# Patient Record
Sex: FEMALE | HISPANIC OR LATINO | URBAN - METROPOLITAN AREA
[De-identification: names, ages, dates, MRNs, and addresses within clinical notes are randomized per-mention and may not be internally consistent; named-entity substitution may affect disease eponyms.]

---

## 2020-10-30 ENCOUNTER — OFFICE VISIT (OUTPATIENT)
Dept: URBAN - METROPOLITAN AREA CLINIC 27 | Facility: CLINIC | Age: 40
End: 2020-10-30
Payer: COMMERCIAL

## 2020-10-30 DIAGNOSIS — E11.3513 TYPE 2 DIABETES MELLITUS WITH PROLIFERATIVE DIABETIC RETINOPATHY WITH MACULAR EDEMA, BILATERAL: Primary | ICD-10-CM

## 2020-10-30 DIAGNOSIS — H33.41 TRACTION DETACHMENT OF RETINA, RIGHT EYE: ICD-10-CM

## 2020-10-30 DIAGNOSIS — H43.813 VITREOUS DEGENERATION, BILATERAL: ICD-10-CM

## 2020-10-30 DIAGNOSIS — H04.123 DRY EYE SYNDROME OF BILATERAL LACRIMAL GLANDS: ICD-10-CM

## 2020-10-30 DIAGNOSIS — H33.051 TOTAL RETINAL DETACHMENT, RIGHT EYE: ICD-10-CM

## 2020-10-30 PROCEDURE — 92134 CPTRZ OPH DX IMG PST SGM RTA: CPT | Performed by: OPHTHALMOLOGY

## 2020-10-30 PROCEDURE — 67210 TREATMENT OF RETINAL LESION: CPT | Performed by: OPHTHALMOLOGY

## 2020-10-30 PROCEDURE — 67028 INJECTION EYE DRUG: CPT | Performed by: OPHTHALMOLOGY

## 2020-10-30 PROCEDURE — 92014 COMPRE OPH EXAM EST PT 1/>: CPT | Performed by: OPHTHALMOLOGY

## 2020-10-30 ASSESSMENT — INTRAOCULAR PRESSURE
OD: 18
OS: 22

## 2020-10-30 NOTE — IMPRESSION/PLAN
Impression: h/o Tractional retinal detachment OS. Status: Improved. s/p PPV/MP/PRP/C3F8 Gas 02/24/14 (DYK) Plan: Retina flat. RDW.

## 2020-10-30 NOTE — IMPRESSION/PLAN
Impression: DME OS. Status: Symptomatic
s/p Avastin x 2 last 08/28/14 Focal laser last 04/26/18 Plan: Exam and OCT reveal mild extrafoveal DME. Recommend focal laser. Will schedule.

## 2020-10-30 NOTE — IMPRESSION/PLAN
Impression: h/o TRD OD. Status: Improved. Resolved. s/p PPV / MP / PRP / possible gas 07/06/15 (DYK) Plan: Retina flat. RDW.

## 2020-10-30 NOTE — IMPRESSION/PLAN
Impression: PDR, OS. Status: Symptomatic.
s/p PRP 06/19/15 Plan: An IVFA from 10/22/15 demonstrated no NVE. Fundus Photos from 10/22/15 demonstrated no NVD. Observe.

## 2020-10-30 NOTE — IMPRESSION/PLAN
Impression: PDR, OD. Status: Controlled. s/p PRP 01/30/14 Plan: DM since 1995. Severe. Exam and OCT reveal no DME OD. An IVFA from 10/22/15 demonstrated no NVE. Fundus Photos from 10/22/15 demonstrated no NVD. Observe. BS control.

## 2020-10-30 NOTE — IMPRESSION/PLAN
Impression: DME, OD. Status: Symptomatic
s/p Focal laser last 05/16/19
s/p Avastin x 14 last 09/11/2019 Plan: The patient was lost to follow up. Exam and OCT reveal extrafoveal DME. Recommend Avastin series with Focal. 
R/B/A discussed with patient. The risks of Avastin were discussed, including off-label use and compounding pharmacy risks, and the patient elects to proceed with Avastin. After 2% Subconjunctival anesthesia & betadine prep, 1.25mg of Avastin was injected in the eye. Cold compress and Tylenol suggested for pain if needed. Thanks, Suresh Louise. 

Return in 1 week Focal OD, then focal OS, and in 1 month for re-eval DME, OCT OU, IVFA OD 1st

## 2020-11-05 ENCOUNTER — PROCEDURE (OUTPATIENT)
Dept: URBAN - METROPOLITAN AREA CLINIC 41 | Facility: CLINIC | Age: 40
End: 2020-11-05
Payer: COMMERCIAL

## 2020-11-05 PROCEDURE — 67210 TREATMENT OF RETINAL LESION: CPT | Performed by: OPHTHALMOLOGY

## 2020-11-05 ASSESSMENT — INTRAOCULAR PRESSURE
OD: 22
OS: 22

## 2021-02-11 ENCOUNTER — OFFICE VISIT (OUTPATIENT)
Dept: URBAN - METROPOLITAN AREA CLINIC 41 | Facility: CLINIC | Age: 41
End: 2021-02-11
Payer: COMMERCIAL

## 2021-02-11 PROCEDURE — 92014 COMPRE OPH EXAM EST PT 1/>: CPT | Performed by: OPHTHALMOLOGY

## 2021-02-11 PROCEDURE — 67028 INJECTION EYE DRUG: CPT | Performed by: OPHTHALMOLOGY

## 2021-02-11 PROCEDURE — 92134 CPTRZ OPH DX IMG PST SGM RTA: CPT | Performed by: OPHTHALMOLOGY

## 2021-02-11 ASSESSMENT — INTRAOCULAR PRESSURE
OD: 15
OS: 18

## 2021-02-11 NOTE — IMPRESSION/PLAN
Impression: DME, OD. Status: Symptomatic
s/p Focal laser last 11/5/2020
s/p Avastin x 15 last 10/30/2020 Plan: The patient was lost to follow up. Exam and OCT reveal extrafoveal DME. An IVFA from 02/11/2021 demonstrated no NVE. Fundus photos from 02/11/2021 demonstrated no NVD. Recommend Avastin series with focal.
R/B/A discussed with patient. The risks of Avastin were discussed, including off-label use and compounding pharmacy risks, and the patient elects to proceed with Avastin. After 2% Subconjunctival anesthesia & betadine prep, 1.25mg of Avastin was injected in the eye. Cold compress and Tylenol suggested for pain if needed. Thanks, Suresh Louise. Return in 1 week for focal OD, then focal OS, and 1 month for re-eval DME, OCT OU.

## 2022-05-27 ENCOUNTER — OFFICE VISIT (OUTPATIENT)
Dept: URBAN - METROPOLITAN AREA CLINIC 41 | Facility: CLINIC | Age: 42
End: 2022-05-27
Payer: COMMERCIAL

## 2022-05-27 DIAGNOSIS — E11.3513 TYPE 2 DIAB WITH PROLIF DIAB RTNOP WITH MACULAR EDEMA, BI: Primary | ICD-10-CM

## 2022-05-27 DIAGNOSIS — H33.051 TOTAL RETINAL DETACHMENT, RIGHT EYE: ICD-10-CM

## 2022-05-27 DIAGNOSIS — H33.41 TRACTION DETACHMENT OF RETINA, RIGHT EYE: ICD-10-CM

## 2022-05-27 DIAGNOSIS — H25.13 AGE-RELATED NUCLEAR CATARACT, BILATERAL: ICD-10-CM

## 2022-05-27 PROCEDURE — 92134 CPTRZ OPH DX IMG PST SGM RTA: CPT | Performed by: OPHTHALMOLOGY

## 2022-05-27 PROCEDURE — 99214 OFFICE O/P EST MOD 30 MIN: CPT | Performed by: OPHTHALMOLOGY

## 2022-05-27 ASSESSMENT — INTRAOCULAR PRESSURE
OD: 25
OS: 20

## 2022-05-27 NOTE — IMPRESSION/PLAN
Impression: Age-related nuclear cataract, bilateral: H25.13. Plan: + PSC cataract OS. Rec follow up with Dr. Mary Coburn for evaluation.

## 2022-05-27 NOTE — IMPRESSION/PLAN
Impression: h/o Traction detachment of retina, right eye  H33.41
s/p PPV / MP / PRP / possible gas 07/06/15 (DYK) Plan: Retina flat. RDW.

## 2022-05-27 NOTE — IMPRESSION/PLAN
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
s/p PRP 01/30/14 Plan: Patient presents with a decline in vision OS. Exam and OCT demonstrate DR is stable. Decline appears related to [facility] cataract OS. See plan for cataract. DM since 1995. Severe. Exam and OCT reveal no DME OD. An IVFA from 10/22/15 demonstrated no NVE. Fundus Photos from 10/22/15 demonstrated no NVD. Observe. BS control. 

RTC 6 mo with OCT OU

## 2022-05-27 NOTE — IMPRESSION/PLAN
Impression: h/o Total retinal detachment, right eye H33.051
s/p PPV/MP/PRP/C3F8 Gas 02/24/14 (DYK) Plan: Retina flat. RDW.

## 2023-01-26 ENCOUNTER — OFFICE VISIT (OUTPATIENT)
Dept: URBAN - METROPOLITAN AREA CLINIC 41 | Facility: CLINIC | Age: 43
End: 2023-01-26
Payer: COMMERCIAL

## 2023-01-26 DIAGNOSIS — H43.813 VITREOUS DEGENERATION, BILATERAL: ICD-10-CM

## 2023-01-26 DIAGNOSIS — H33.41 TRACTION DETACHMENT OF RETINA, RIGHT EYE: ICD-10-CM

## 2023-01-26 DIAGNOSIS — H33.051 TOTAL RETINAL DETACHMENT, RIGHT EYE: ICD-10-CM

## 2023-01-26 PROCEDURE — 99214 OFFICE O/P EST MOD 30 MIN: CPT | Performed by: OPHTHALMOLOGY

## 2023-01-26 PROCEDURE — 67210 TREATMENT OF RETINAL LESION: CPT | Performed by: OPHTHALMOLOGY

## 2023-01-26 PROCEDURE — 92134 CPTRZ OPH DX IMG PST SGM RTA: CPT | Performed by: OPHTHALMOLOGY

## 2023-01-26 ASSESSMENT — INTRAOCULAR PRESSURE
OD: 23
OS: 24

## 2023-01-26 NOTE — IMPRESSION/PLAN
Impression: PVD, OU. Status: Symptomatic. Cataracts OU - cleared for CE from the retinal standpoint. Followed by Dr. Alba Noriega: JESU.

## 2023-01-26 NOTE — IMPRESSION/PLAN
Impression: DME, OD. Status: Symptomatic
s/p Focal laser last 11/5/2020
s/p Avastin x 16 last 02/11/2021  Plan: The patient was lost to follow up. Exam and OCT reveal extrafoveal DME. An IVFA from 02/11/2021 demonstrated no NVE. Fundus photos from 02/11/2021 demonstrated no NVD. Recommend focal. RBA discussed. The patient elects focal. Thanks, Ministerio Jackson. 

Return in 1 day for focal OS, and in 4 months for re-eval DME, OCT OU, IVFA OD 1st

## 2023-01-26 NOTE — IMPRESSION/PLAN
Impression: PDR, OD. Status: Controlled. s/p PRP 01/30/14 Plan: DM since 1995. Severe. Exam and OCT reveal no DME OD. Observe. BS control.

## 2023-01-27 ENCOUNTER — PROCEDURE (OUTPATIENT)
Dept: URBAN - METROPOLITAN AREA CLINIC 27 | Facility: CLINIC | Age: 43
End: 2023-01-27
Payer: COMMERCIAL

## 2023-01-27 DIAGNOSIS — E11.3513 TYPE 2 DIABETES MELLITUS WITH PROLIFERATIVE DIABETIC RETINOPATHY WITH MACULAR EDEMA, BILATERAL: Primary | ICD-10-CM

## 2023-01-27 PROCEDURE — 67210 TREATMENT OF RETINAL LESION: CPT | Performed by: OPHTHALMOLOGY

## 2023-01-27 ASSESSMENT — INTRAOCULAR PRESSURE
OS: 20
OD: 20

## 2023-05-10 ENCOUNTER — OFFICE VISIT (OUTPATIENT)
Dept: URBAN - METROPOLITAN AREA CLINIC 27 | Facility: CLINIC | Age: 43
End: 2023-05-10
Payer: COMMERCIAL

## 2023-05-10 DIAGNOSIS — H43.813 VITREOUS DEGENERATION, BILATERAL: ICD-10-CM

## 2023-05-10 DIAGNOSIS — E11.3513 TYPE 2 DIAB WITH PROLIF DIAB RTNOP WITH MACULAR EDEMA, BI: Primary | ICD-10-CM

## 2023-05-10 DIAGNOSIS — H33.051 TOTAL RETINAL DETACHMENT, RIGHT EYE: ICD-10-CM

## 2023-05-10 DIAGNOSIS — H33.41 TRACTION DETACHMENT OF RETINA, RIGHT EYE: ICD-10-CM

## 2023-05-10 PROCEDURE — 92235 FLUORESCEIN ANGRPH MLTIFRAME: CPT | Performed by: OPHTHALMOLOGY

## 2023-05-10 PROCEDURE — 92014 COMPRE OPH EXAM EST PT 1/>: CPT | Performed by: OPHTHALMOLOGY

## 2023-05-10 PROCEDURE — 92134 CPTRZ OPH DX IMG PST SGM RTA: CPT | Performed by: OPHTHALMOLOGY

## 2023-05-10 PROCEDURE — 67210 TREATMENT OF RETINAL LESION: CPT | Performed by: OPHTHALMOLOGY

## 2023-05-10 ASSESSMENT — INTRAOCULAR PRESSURE
OD: 23
OS: 21

## 2023-05-10 NOTE — IMPRESSION/PLAN
Impression: DME, OD. Status: Symptomatic
s/p Focal laser last 01/26/2023 
s/p Avastin x 16 last 02/11/2021 Plan: Exam and OCT reveal extrafoveal DME. An IVFA from 05/10/2023 demonstrated no NVE. Fundus photos from 05/10/2023 demonstrated no NVD. Recommend focal. RBA discussed. The patient elects focal. 
There has been improvement. Carotenoids. Thanks, Marie Patten. 

Return in 1 week for focal OS, and in 18 months for re-eval DME, OCT OU, IVFA OD 1st

## 2023-05-10 NOTE — IMPRESSION/PLAN
Impression: DME OS. Status: Symptomatic
s/p Avastin x 2 last 08/28/14 Focal laser last 01/27/2023  Plan: Exam and OCT reveal mild extrafoveal DME. Recommend focal laser. Will schedule.

## 2023-05-10 NOTE — IMPRESSION/PLAN
Impression: PVD, OU. Status: Symptomatic. Cataracts OU - cleared for CE from the retinal standpoint. Followed by Dr. Thais Amado: JESU.

## 2024-02-15 ENCOUNTER — OFFICE VISIT (OUTPATIENT)
Dept: URBAN - METROPOLITAN AREA CLINIC 27 | Facility: CLINIC | Age: 44
End: 2024-02-15
Payer: COMMERCIAL

## 2024-02-15 DIAGNOSIS — E11.3513 TYPE 2 DIAB WITH PROLIF DIAB RTNOP WITH MACULAR EDEMA, BI: Primary | ICD-10-CM

## 2024-02-15 DIAGNOSIS — H40.9 GLAUCOMA: ICD-10-CM

## 2024-02-15 DIAGNOSIS — H25.13 AGE-RELATED NUCLEAR CATARACT, BILATERAL: ICD-10-CM

## 2024-02-15 DIAGNOSIS — H33.053 TOTAL RETINAL DETACHMENT, BILATERAL: ICD-10-CM

## 2024-02-15 PROCEDURE — 92014 COMPRE OPH EXAM EST PT 1/>: CPT | Performed by: OPHTHALMOLOGY

## 2024-02-15 PROCEDURE — 92134 CPTRZ OPH DX IMG PST SGM RTA: CPT | Performed by: OPHTHALMOLOGY

## 2024-02-15 ASSESSMENT — INTRAOCULAR PRESSURE
OD: 20
OS: 18

## 2025-02-13 ENCOUNTER — OFFICE VISIT (OUTPATIENT)
Dept: URBAN - METROPOLITAN AREA CLINIC 27 | Facility: CLINIC | Age: 45
End: 2025-02-13
Payer: COMMERCIAL

## 2025-02-13 DIAGNOSIS — H40.9 GLAUCOMA: ICD-10-CM

## 2025-02-13 DIAGNOSIS — Z96.1 PRESENCE OF INTRAOCULAR LENS: ICD-10-CM

## 2025-02-13 DIAGNOSIS — E11.3513 TYPE 2 DIABETES MELLITUS WITH PROLIFERATIVE DIABETIC RETINOPATHY WITH MACULAR EDEMA, BILATERAL: Primary | ICD-10-CM

## 2025-02-13 DIAGNOSIS — H33.053 TOTAL RETINAL DETACHMENT, BILATERAL: ICD-10-CM

## 2025-02-13 PROCEDURE — 92014 COMPRE OPH EXAM EST PT 1/>: CPT | Performed by: OPHTHALMOLOGY

## 2025-02-13 PROCEDURE — 92134 CPTRZ OPH DX IMG PST SGM RTA: CPT | Performed by: OPHTHALMOLOGY

## 2025-02-13 ASSESSMENT — INTRAOCULAR PRESSURE
OS: 23
OD: 24